# Patient Record
Sex: MALE | Race: WHITE | NOT HISPANIC OR LATINO | URBAN - METROPOLITAN AREA
[De-identification: names, ages, dates, MRNs, and addresses within clinical notes are randomized per-mention and may not be internally consistent; named-entity substitution may affect disease eponyms.]

---

## 2017-04-27 ENCOUNTER — EMERGENCY (EMERGENCY)
Facility: HOSPITAL | Age: 37
LOS: 1 days | Discharge: HOME | End: 2017-04-27
Attending: EMERGENCY MEDICINE | Admitting: EMERGENCY MEDICINE
Payer: COMMERCIAL

## 2017-04-27 VITALS — WEIGHT: 179.9 LBS | HEIGHT: 75 IN

## 2017-04-27 DIAGNOSIS — M25.531 PAIN IN RIGHT WRIST: ICD-10-CM
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
## 2017-04-27 DIAGNOSIS — Z79.899 OTHER LONG TERM (CURRENT) DRUG THERAPY: ICD-10-CM

## 2017-04-27 PROCEDURE — 99283 EMERGENCY DEPT VISIT LOW MDM: CPT

## 2017-04-27 PROCEDURE — 73110 X-RAY EXAM OF WRIST: CPT | Mod: 26,RT

## 2017-04-27 PROCEDURE — 99283 EMERGENCY DEPT VISIT LOW MDM: CPT | Mod: 25

## 2017-04-27 PROCEDURE — 73110 X-RAY EXAM OF WRIST: CPT

## 2017-04-27 RX ORDER — BUPRENORPHINE AND NALOXONE 2; .5 MG/1; MG/1
1 TABLET SUBLINGUAL
Qty: 0 | Refills: 0 | COMMUNITY

## 2017-04-27 RX ORDER — CLONAZEPAM 1 MG
0 TABLET ORAL
Qty: 0 | Refills: 0 | COMMUNITY

## 2017-04-27 RX ORDER — ACETAMINOPHEN 500 MG
975 TABLET ORAL ONCE
Qty: 0 | Refills: 0 | Status: COMPLETED | OUTPATIENT
Start: 2017-04-27 | End: 2017-04-27

## 2017-04-27 RX ADMIN — Medication 975 MILLIGRAM(S): at 10:27

## 2017-04-27 RX ADMIN — Medication 975 MILLIGRAM(S): at 09:57

## 2017-04-27 NOTE — ED ADULT NURSE NOTE - OBJECTIVE STATEMENT
36 yr old male with h/o drug abuse present to the ER for s/p fall. Pt reports he was walking his dog this morning when he slipped and fell on a wet pavement sustaining injury to the right wrist. Pt reports pain level of 8/10 on pain scale and aching in quality. No swelling or deformity noted to the affected hand. Pt has full ROM in all extremities.

## 2017-04-27 NOTE — ED PROVIDER NOTE - CARE PLAN
Principal Discharge DX:	Wrist pain, acute, right  Instructions for follow-up, activity and diet:	1. You may take Motrin 600mg every 6 hours as needed for pain. You may use the provided wrist splint for support and comfort.  2. Follow up with your Primary Care Physician as soon as possible for further evaluation.   3. Return to the Emergency Department for any concerning symptoms.

## 2017-04-27 NOTE — ED PROVIDER NOTE - MEDICAL DECISION MAKING DETAILS
Attending Merchant: 35 y/o male s/p fall onto outstretched hand with complaints of wrist pain. no ttp anatomic snuffbox, or deformity seen. pt able to range hand. xray shows no signs of fracture. will advised RICE, follow up if pain continues

## 2017-04-27 NOTE — ED ADULT NURSE NOTE - CHPI ED SYMPTOMS NEG
no tingling/no numbness/no fever/no weakness/no deformity/no loss of consciousness/no vomiting/no abrasion/no confusion/no bleeding

## 2017-04-27 NOTE — ED PROVIDER NOTE - OBJECTIVE STATEMENT
36 y.o. male no PMHx p/w right wrist injury. Patient was walking his dog this morning when he slipped on the wet pavement, attempted to brace himself with the right hand. He reports pain in the area of the distal radius. Denies numbness/tingling. Did not take pain medications (is in recovery for drug dependence).

## 2017-11-22 ENCOUNTER — EMERGENCY (EMERGENCY)
Facility: HOSPITAL | Age: 37
LOS: 1 days | Discharge: HOME | End: 2017-11-22
Attending: EMERGENCY MEDICINE | Admitting: EMERGENCY MEDICINE
Payer: COMMERCIAL

## 2017-11-22 VITALS
SYSTOLIC BLOOD PRESSURE: 122 MMHG | TEMPERATURE: 100 F | HEART RATE: 107 BPM | WEIGHT: 175.05 LBS | HEIGHT: 75 IN | DIASTOLIC BLOOD PRESSURE: 83 MMHG | RESPIRATION RATE: 19 BRPM | OXYGEN SATURATION: 100 %
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
## 2017-11-22 DIAGNOSIS — Z79.899 OTHER LONG TERM (CURRENT) DRUG THERAPY: ICD-10-CM
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
## 2017-11-22 DIAGNOSIS — M25.531 PAIN IN RIGHT WRIST: ICD-10-CM

## 2017-11-22 LAB
ALBUMIN SERPL ELPH-MCNC: 4.5 G/DL — SIGNIFICANT CHANGE UP (ref 3.3–5)
ALP SERPL-CCNC: 54 U/L — SIGNIFICANT CHANGE UP (ref 40–120)
ALT FLD-CCNC: 10 U/L RC — SIGNIFICANT CHANGE UP (ref 10–45)
ANION GAP SERPL CALC-SCNC: 14 MMOL/L — SIGNIFICANT CHANGE UP (ref 5–17)
AST SERPL-CCNC: 12 U/L — SIGNIFICANT CHANGE UP (ref 10–40)
BASOPHILS # BLD AUTO: 0.1 K/UL — SIGNIFICANT CHANGE UP (ref 0–0.2)
BASOPHILS NFR BLD AUTO: 0.6 % — SIGNIFICANT CHANGE UP (ref 0–2)
BILIRUB SERPL-MCNC: 1 MG/DL — SIGNIFICANT CHANGE UP (ref 0.2–1.2)
BUN SERPL-MCNC: 9 MG/DL — SIGNIFICANT CHANGE UP (ref 7–23)
CALCIUM SERPL-MCNC: 9.9 MG/DL — SIGNIFICANT CHANGE UP (ref 8.4–10.5)
CHLORIDE SERPL-SCNC: 97 MMOL/L — SIGNIFICANT CHANGE UP (ref 96–108)
CO2 SERPL-SCNC: 28 MMOL/L — SIGNIFICANT CHANGE UP (ref 22–31)
CREAT SERPL-MCNC: 0.89 MG/DL — SIGNIFICANT CHANGE UP (ref 0.5–1.3)
EOSINOPHIL # BLD AUTO: 0 K/UL — SIGNIFICANT CHANGE UP (ref 0–0.5)
EOSINOPHIL NFR BLD AUTO: 0.3 % — SIGNIFICANT CHANGE UP (ref 0–6)
GLUCOSE SERPL-MCNC: 110 MG/DL — HIGH (ref 70–99)
HCT VFR BLD CALC: 45.2 % — SIGNIFICANT CHANGE UP (ref 39–50)
HGB BLD-MCNC: 15.5 G/DL — SIGNIFICANT CHANGE UP (ref 13–17)
LYMPHOCYTES # BLD AUTO: 1.1 K/UL — SIGNIFICANT CHANGE UP (ref 1–3.3)
LYMPHOCYTES # BLD AUTO: 8.8 % — LOW (ref 13–44)
MCHC RBC-ENTMCNC: 33.3 PG — SIGNIFICANT CHANGE UP (ref 27–34)
MCHC RBC-ENTMCNC: 34.3 GM/DL — SIGNIFICANT CHANGE UP (ref 32–36)
MCV RBC AUTO: 97.2 FL — SIGNIFICANT CHANGE UP (ref 80–100)
MONOCYTES # BLD AUTO: 1.4 K/UL — HIGH (ref 0–0.9)
MONOCYTES NFR BLD AUTO: 11 % — SIGNIFICANT CHANGE UP (ref 2–14)
NEUTROPHILS # BLD AUTO: 10.3 K/UL — HIGH (ref 1.8–7.4)
NEUTROPHILS NFR BLD AUTO: 79.3 % — HIGH (ref 43–77)
PLATELET # BLD AUTO: 272 K/UL — SIGNIFICANT CHANGE UP (ref 150–400)
POTASSIUM SERPL-MCNC: 4 MMOL/L — SIGNIFICANT CHANGE UP (ref 3.5–5.3)
POTASSIUM SERPL-SCNC: 4 MMOL/L — SIGNIFICANT CHANGE UP (ref 3.5–5.3)
PROT SERPL-MCNC: 7.4 G/DL — SIGNIFICANT CHANGE UP (ref 6–8.3)
RBC # BLD: 4.65 M/UL — SIGNIFICANT CHANGE UP (ref 4.2–5.8)
RBC # FLD: 11.7 % — SIGNIFICANT CHANGE UP (ref 10.3–14.5)
SODIUM SERPL-SCNC: 139 MMOL/L — SIGNIFICANT CHANGE UP (ref 135–145)
WBC # BLD: 13 K/UL — HIGH (ref 3.8–10.5)
WBC # FLD AUTO: 13 K/UL — HIGH (ref 3.8–10.5)

## 2017-11-22 PROCEDURE — 96375 TX/PRO/DX INJ NEW DRUG ADDON: CPT | Mod: XU

## 2017-11-22 PROCEDURE — 70487 CT MAXILLOFACIAL W/DYE: CPT

## 2017-11-22 PROCEDURE — 85027 COMPLETE CBC AUTOMATED: CPT

## 2017-11-22 PROCEDURE — 99285 EMERGENCY DEPT VISIT HI MDM: CPT

## 2017-11-22 PROCEDURE — 96374 THER/PROPH/DIAG INJ IV PUSH: CPT | Mod: XU

## 2017-11-22 PROCEDURE — 80053 COMPREHEN METABOLIC PANEL: CPT

## 2017-11-22 PROCEDURE — 70487 CT MAXILLOFACIAL W/DYE: CPT | Mod: 26

## 2017-11-22 PROCEDURE — 99284 EMERGENCY DEPT VISIT MOD MDM: CPT | Mod: 25

## 2017-11-22 RX ORDER — AMPICILLIN SODIUM AND SULBACTAM SODIUM 250; 125 MG/ML; MG/ML
1.5 INJECTION, POWDER, FOR SUSPENSION INTRAMUSCULAR; INTRAVENOUS ONCE
Qty: 0 | Refills: 0 | Status: COMPLETED | OUTPATIENT
Start: 2017-11-22 | End: 2017-11-22

## 2017-11-22 RX ORDER — KETOROLAC TROMETHAMINE 30 MG/ML
15 SYRINGE (ML) INJECTION ONCE
Qty: 0 | Refills: 0 | Status: DISCONTINUED | OUTPATIENT
Start: 2017-11-22 | End: 2017-11-22

## 2017-11-22 RX ORDER — SODIUM CHLORIDE 9 MG/ML
2000 INJECTION INTRAMUSCULAR; INTRAVENOUS; SUBCUTANEOUS ONCE
Qty: 0 | Refills: 0 | Status: COMPLETED | OUTPATIENT
Start: 2017-11-22 | End: 2017-11-22

## 2017-11-22 RX ADMIN — Medication 15 MILLIGRAM(S): at 13:37

## 2017-11-22 RX ADMIN — SODIUM CHLORIDE 1000 MILLILITER(S): 9 INJECTION INTRAMUSCULAR; INTRAVENOUS; SUBCUTANEOUS at 13:37

## 2017-11-22 RX ADMIN — AMPICILLIN SODIUM AND SULBACTAM SODIUM 100 GRAM(S): 250; 125 INJECTION, POWDER, FOR SUSPENSION INTRAMUSCULAR; INTRAVENOUS at 13:36

## 2017-11-22 NOTE — ED PROVIDER NOTE - PROGRESS NOTE DETAILS
Patient has holden-apical abscesses, spoke with dental resident, will see in clinic. Zabrina Peguero DO

## 2017-11-22 NOTE — ED PROVIDER NOTE - ATTENDING CONTRIBUTION TO CARE
Trinidad Nguyen MD  38yo male with left dental pain and left facial swelling, concerning for dental abscess with extension into sinuses, less concerning for cavernous venous thrombosis with normal neuro exam, on exam YUE, EOMI, dental caries 12-14; facial swelling and under the left eye; Plan: IV antibiotics, obtain CT maxillofacil, labs, dental consulted, continuing to follow.

## 2017-11-22 NOTE — ED PROVIDER NOTE - MEDICAL DECISION MAKING DETAILS
36yo male with left dental pain and left facial swelling, concerning for dental abscess with extension into sinuses, less concerning for cavernous venous thrombosis with normal neuro exam, will give IV antibiotics, obtain CT maxillofacil, labs, dental consulted, continuing to follow. Zabrina Peguero DO

## 2017-11-22 NOTE — ED PROVIDER NOTE - CARE PLAN
Principal Discharge DX:	Periapical abscess  Instructions for follow-up, activity and diet:	1. Please go to the dental clinic at [address] [place] [address] after you leave the emergency department today.   2.  Return to the Emergency Department for worsening, progressive or any other concerning symptoms.

## 2017-11-22 NOTE — ED PROVIDER NOTE - PHYSICAL EXAMINATION
Gen: NAD, AOx3  Head: NCAT  HEENT: EOMI, poor dentition, with fluctuance over buccal surface of left upper molar, +TTP over maxillary sinus, +facial swelling over left side of face  MSK: No edema, no visible deformities, full range of motion in all 4 extremities  Neuro: CN II-XII grossly intact, No focal neurologic deficits  Skin: No rash   Psych: normal affect

## 2017-11-22 NOTE — ED PROVIDER NOTE - OBJECTIVE STATEMENT
36yo male no PMH presenting with left upper molar pain x 2 days, started on amoxicillin yesterday and told that he needs to have a root canal which is scheduled for Monday. Patient woke up this morning and noticed that the left side of his face was swollen, with fever tmax 101F. Took Tylenol earlier this morning with some relief. No visual changes. No drainage from the tooth.

## 2017-11-22 NOTE — ED PROVIDER NOTE - NOTES
Spoke with dental resident, plan for CT maxillofacial to r/o extension into sinuses, will call dental resident back when resulted and discuss plan to see patient in ED vs office later today. Zabrina Peguero DO

## 2017-11-22 NOTE — ED ADULT NURSE NOTE - OBJECTIVE STATEMENT
Male 37 years old with no medical history came in for left face pain and swelling. PT had tooth ache yesterday and went to dentist and was started on Amoxicillin. Last night he had a fever temp 101 and this morning he noticed his left face red and swollen. Denies nausea, vomiting or change in vision. PERRL. Labs obtained. Will monitor.

## 2017-11-22 NOTE — ED PROVIDER NOTE - PLAN OF CARE
1. Please go to the dental clinic at [address] [place] [phone] after you leave the emergency department today.   2.  Return to the Emergency Department for worsening, progressive or any other concerning symptoms.

## 2021-05-10 ENCOUNTER — APPOINTMENT (OUTPATIENT)
Dept: DISASTER EMERGENCY | Facility: OTHER | Age: 41
End: 2021-05-10
Payer: COMMERCIAL

## 2021-05-10 PROCEDURE — 0012A: CPT

## 2022-12-12 ENCOUNTER — APPOINTMENT (OUTPATIENT)
Dept: NEUROLOGY | Facility: CLINIC | Age: 42
End: 2022-12-12

## 2022-12-12 VITALS
WEIGHT: 155 LBS | BODY MASS INDEX: 19.89 KG/M2 | DIASTOLIC BLOOD PRESSURE: 77 MMHG | HEIGHT: 74 IN | HEART RATE: 54 BPM | SYSTOLIC BLOOD PRESSURE: 143 MMHG

## 2022-12-12 DIAGNOSIS — S09.90XA UNSPECIFIED INJURY OF HEAD, INITIAL ENCOUNTER: ICD-10-CM

## 2022-12-12 PROCEDURE — 99205 OFFICE O/P NEW HI 60 MIN: CPT

## 2022-12-12 NOTE — PHYSICAL EXAM
[General Appearance - Alert] : alert [Oriented To Time, Place, And Person] : oriented to person, place, and time [Person] : oriented to person [Place] : oriented to place [Time] : oriented to time [Short Term Intact] : short term memory intact [Fluency] : fluency intact [Current Events] : adequate knowledge of current events [Cranial Nerves Optic (II)] : visual acuity intact bilaterally,  visual fields full to confrontation, pupils equal round and reactive to light [Cranial Nerves Oculomotor (III)] : extraocular motion intact [Cranial Nerves Trigeminal (V)] : facial sensation intact symmetrically [Cranial Nerves Facial (VII)] : face symmetrical [Cranial Nerves Vestibulocochlear (VIII)] : hearing was intact bilaterally [Cranial Nerves Accessory (XI - Cranial And Spinal)] : head turning and shoulder shrug symmetric [Motor Tone] : muscle tone was normal in all four extremities [Motor Strength] : muscle strength was normal in all four extremities [Sensation Tactile Decrease] : light touch was intact [Abnormal Walk] : normal gait [Coordination - Dysmetria Impaired Finger-to-Nose Bilateral] : not present [1+] : Patella left 1+ [FreeTextEntry8] : bracing his arms out in case he felt lightheaded.

## 2022-12-12 NOTE — HISTORY OF PRESENT ILLNESS
[FreeTextEntry1] : 42-year-old gentleman who hit the back of his head when he got up from a kneeling position and he hit the back of his head on a wooden frame.  This occurred yesterday on December 11.  Patient did not have any loss of consciousness however he continues to have headaches along with lightheadedness when he moves his head quickly and photophobia.  Patient has no prior history of headaches and no history of concussions before this.

## 2022-12-19 ENCOUNTER — APPOINTMENT (OUTPATIENT)
Dept: CT IMAGING | Facility: CLINIC | Age: 42
End: 2022-12-19

## 2022-12-19 ENCOUNTER — TRANSCRIPTION ENCOUNTER (OUTPATIENT)
Age: 42
End: 2022-12-19

## 2022-12-19 PROCEDURE — 70450 CT HEAD/BRAIN W/O DYE: CPT

## 2022-12-20 ENCOUNTER — NON-APPOINTMENT (OUTPATIENT)
Age: 42
End: 2022-12-20

## 2022-12-28 ENCOUNTER — NON-APPOINTMENT (OUTPATIENT)
Age: 42
End: 2022-12-28

## 2023-01-10 ENCOUNTER — APPOINTMENT (OUTPATIENT)
Dept: MRI IMAGING | Facility: CLINIC | Age: 43
End: 2023-01-10

## 2024-09-17 ENCOUNTER — APPOINTMENT (OUTPATIENT)
Dept: ORTHOPEDIC SURGERY | Facility: CLINIC | Age: 44
End: 2024-09-17

## 2024-09-17 VITALS — BODY MASS INDEX: 19.89 KG/M2 | HEIGHT: 74 IN | WEIGHT: 155 LBS

## 2024-09-17 DIAGNOSIS — M75.02 ADHESIVE CAPSULITIS OF LEFT SHOULDER: ICD-10-CM

## 2024-09-17 DIAGNOSIS — Z78.9 OTHER SPECIFIED HEALTH STATUS: ICD-10-CM

## 2024-09-17 DIAGNOSIS — M75.32 CALCIFIC TENDINITIS OF LEFT SHOULDER: ICD-10-CM

## 2024-09-17 DIAGNOSIS — M75.112 INCOMPLETE ROTATOR CUFF TEAR OR RUPTURE OF LEFT SHOULDER, NOT SPECIFIED AS TRAUMATIC: ICD-10-CM

## 2024-09-17 PROCEDURE — 99204 OFFICE O/P NEW MOD 45 MIN: CPT | Mod: 25

## 2024-09-17 PROCEDURE — J3490M: CUSTOM

## 2024-09-17 PROCEDURE — 20611 DRAIN/INJ JOINT/BURSA W/US: CPT | Mod: LT

## 2024-09-17 PROCEDURE — 73030 X-RAY EXAM OF SHOULDER: CPT | Mod: LT

## 2024-09-17 RX ORDER — MELOXICAM 15 MG/1
15 TABLET ORAL
Qty: 30 | Refills: 2 | Status: ACTIVE | COMMUNITY
Start: 2024-09-17 | End: 1900-01-01

## 2024-09-17 NOTE — PHYSICAL EXAM
[] : motor and sensory intact distally [Left] : left shoulder [There are no fractures, subluxations or dislocations. No significant abnormalities are seen] : There are no fractures, subluxations or dislocations. No significant abnormalities are seen

## 2024-09-17 NOTE — DISCUSSION/SUMMARY
[de-identified] : modify activities try OTC meds ice as needed try topical lidocaine for pain control reviewed current medications used by this patient home exercises for functional return 09/17/2024    RE:  FRANCIS RINCON   [id number]    Attention:  Nurse Reviewer /Medical Director  I am writing this letter as a medical necessity for PT program. Patient has tried analgesics, non-steroid anti-inflammatory agents,  hot or cold compresses,injections of corticosteroids, etc)  which in combination or by themselves has not worked. Based on my patient's condition, I strongly believe that the PT is medically needed.   Thank you for your time and consideration.

## 2024-09-17 NOTE — HISTORY OF PRESENT ILLNESS
[Rest] : rest [Gradual] : gradual [7] : 7 [0] : 0 [Dull/Aching] : dull/aching [Sharp] : sharp [Intermittent] : intermittent [Meds] : meds [de-identified] : he is RHD, pain for a few months, no injury, he is ambi, uses OTCs with some help, not activity related, pain is intermittent [] : no [FreeTextEntry1] : LT SHOULDER  [FreeTextEntry5] : Patient states he has been treated for left shoulder pain in past and lately the pain got much worse. Patient went to PCP and was sent for MRI.  [FreeTextEntry9] : motrin  [de-identified] : certain positions  [de-identified] : PCP  [de-identified] : MRI

## 2024-09-17 NOTE — PROCEDURE
[FreeTextEntry3] : Large Joint Injection / Aspiration: Celestone, Lidocaine, Marcaine and Guidance Ultrasound Large Joint Injection was performed because of pain and inflammation. Anesthesia: ethyl chloride sprayed topically..  Celestone: An injection of Celestone 12 mg , 2 cc. Needle size: 22 gauge , 1.5 inch.  Lidocaine: 3 cc.  Marcaine: 3 cc.   Medication was injected in the left shoulder. Patient has tried OTC's including aspirin, Ibuprofen, Aleve etc or prescription NSAIDS, and/or exercises at home and/ or physical therapy without satisfactory response. After verbal consent using sterile preparation and technique. The risks, benefits, and alternatives to cortisone injection were explained in full to the patient. Risks outlined include but are not limited to infection, sepsis, bleeding, scarring, skin discoloration, temporary increase in pain, syncopal episode, failure to resolve symptoms, allergic reaction, symptom recurrence, and elevation of blood sugar in diabetics. Patient understood the risks. All questions were answered. After discussion of options, patient requested an injection. Oral informed consent was obtained and sterile prep was done of the injection site. Sterile technique was utilized for the procedure including the preparation of the solutions used for the injection. Patient tolerated the procedure well. Advised to ice the injection site this evening. Prep with betadine locally to site. Sterile technique used. Patient tolerated procedure well. Post Procedure Instructions: Patient was advised to call if redness, pain, or fever occur and apply ice for 15 min. out of every hour for the next 12-24 hours as tolerated. patient was advised to rest the joint(s) for 1 days.   Ultrasound Guidance was used for the following reasons: for Glenohumeral injection.   Ultrasound guided injection was performed of the shoulder, visualization of the needle and placement of injection was performed without complication.

## 2024-09-17 NOTE — HISTORY OF PRESENT ILLNESS
[Rest] : rest [Gradual] : gradual [7] : 7 [0] : 0 [Dull/Aching] : dull/aching [Sharp] : sharp [Intermittent] : intermittent [Meds] : meds [de-identified] : he is RHD, pain for a few months, no injury, he is ambi, uses OTCs with some help, not activity related, pain is intermittent [] : no [FreeTextEntry1] : LT SHOULDER  [FreeTextEntry5] : Patient states he has been treated for left shoulder pain in past and lately the pain got much worse. Patient went to PCP and was sent for MRI.  [FreeTextEntry9] : motrin  [de-identified] : certain positions  [de-identified] : PCP  [de-identified] : MRI

## 2024-09-17 NOTE — DISCUSSION/SUMMARY
[de-identified] : modify activities try OTC meds ice as needed try topical lidocaine for pain control reviewed current medications used by this patient home exercises for functional return 09/17/2024    RE[facility]  FR[facility]IS RINCON   [id number]    Attention:  Nurse Reviewer /Medical Director  I am writing this letter as a medical necessity for PT program. Patient has tried analgesics, non-steroid anti-inflammatory agents,  hot or cold compresses,injections of corticosteroids, etc)  which in combination or by themselves has not worked. Based on my patient's condition, I strongly believe that the PT is medically needed.   Thank you for your time and consideration.

## 2024-09-17 NOTE — DATA REVIEWED
[MRI] : MRI [Left] : left [Shoulder] : shoulder [Report was reviewed and noted in the chart] : The report was reviewed and noted in the chart [I reviewed the films/CD and agree] : I reviewed the films/CD and agree [FreeTextEntry1] : strain RTC, calcific dep, IS

## 2024-10-29 ENCOUNTER — APPOINTMENT (OUTPATIENT)
Dept: ORTHOPEDIC SURGERY | Facility: CLINIC | Age: 44
End: 2024-10-29

## 2025-01-11 ENCOUNTER — EMERGENCY (EMERGENCY)
Facility: HOSPITAL | Age: 45
LOS: 0 days | Discharge: HOME | End: 2025-01-11
Attending: STUDENT IN AN ORGANIZED HEALTH CARE EDUCATION/TRAINING PROGRAM
Payer: COMMERCIAL

## 2025-01-11 VITALS
TEMPERATURE: 99 F | HEART RATE: 62 BPM | DIASTOLIC BLOOD PRESSURE: 75 MMHG | RESPIRATION RATE: 17 BRPM | SYSTOLIC BLOOD PRESSURE: 132 MMHG | OXYGEN SATURATION: 100 %

## 2025-01-11 VITALS
HEIGHT: 75 IN | HEART RATE: 81 BPM | SYSTOLIC BLOOD PRESSURE: 187 MMHG | DIASTOLIC BLOOD PRESSURE: 85 MMHG | OXYGEN SATURATION: 100 % | RESPIRATION RATE: 15 BRPM | TEMPERATURE: 98 F | WEIGHT: 160.06 LBS

## 2025-01-11 DIAGNOSIS — R25.1 TREMOR, UNSPECIFIED: ICD-10-CM

## 2025-01-11 DIAGNOSIS — R52 PAIN, UNSPECIFIED: ICD-10-CM

## 2025-01-11 DIAGNOSIS — R11.0 NAUSEA: ICD-10-CM

## 2025-01-11 PROCEDURE — 99284 EMERGENCY DEPT VISIT MOD MDM: CPT

## 2025-01-11 RX ORDER — ONDANSETRON HCL/PF 4 MG/2 ML
4 VIAL (ML) INJECTION ONCE
Refills: 0 | Status: COMPLETED | OUTPATIENT
Start: 2025-01-11 | End: 2025-01-11

## 2025-01-11 RX ORDER — KETOROLAC TROMETHAMINE 30 MG/ML
15 INJECTION, SOLUTION INTRAMUSCULAR; INTRAVENOUS ONCE
Refills: 0 | Status: DISCONTINUED | OUTPATIENT
Start: 2025-01-11 | End: 2025-01-11

## 2025-01-11 RX ORDER — BUPRENORPHINE HYDROCHLORIDE, NALOXONE HYDROCHLORIDE 4; 1 MG/1; MG/1
1 FILM, SOLUBLE BUCCAL; SUBLINGUAL ONCE
Refills: 0 | Status: DISCONTINUED | OUTPATIENT
Start: 2025-01-11 | End: 2025-01-11

## 2025-01-11 RX ADMIN — Medication 1000 MILLILITER(S): at 14:45

## 2025-01-11 RX ADMIN — BUPRENORPHINE HYDROCHLORIDE, NALOXONE HYDROCHLORIDE 1 FILM(S): 4; 1 FILM, SOLUBLE BUCCAL; SUBLINGUAL at 14:58

## 2025-01-11 RX ADMIN — KETOROLAC TROMETHAMINE 15 MILLIGRAM(S): 30 INJECTION, SOLUTION INTRAMUSCULAR; INTRAVENOUS at 14:45

## 2025-01-11 RX ADMIN — Medication 4 MILLIGRAM(S): at 13:09

## 2025-01-11 RX ADMIN — Medication 1000 MILLILITER(S): at 13:09

## 2025-01-11 RX ADMIN — Medication 0.1 MILLIGRAM(S): at 13:09

## 2025-01-11 RX ADMIN — BUPRENORPHINE HYDROCHLORIDE, NALOXONE HYDROCHLORIDE 1 FILM(S): 4; 1 FILM, SOLUBLE BUCCAL; SUBLINGUAL at 13:09

## 2025-01-11 RX ADMIN — KETOROLAC TROMETHAMINE 15 MILLIGRAM(S): 30 INJECTION, SOLUTION INTRAMUSCULAR; INTRAVENOUS at 13:09

## 2025-01-13 ENCOUNTER — EMERGENCY (EMERGENCY)
Facility: HOSPITAL | Age: 45
LOS: 0 days | Discharge: HOME | End: 2025-01-13
Attending: STUDENT IN AN ORGANIZED HEALTH CARE EDUCATION/TRAINING PROGRAM
Payer: COMMERCIAL

## 2025-01-13 VITALS
TEMPERATURE: 98 F | OXYGEN SATURATION: 100 % | RESPIRATION RATE: 17 BRPM | SYSTOLIC BLOOD PRESSURE: 144 MMHG | HEART RATE: 56 BPM | DIASTOLIC BLOOD PRESSURE: 77 MMHG

## 2025-01-13 VITALS
HEART RATE: 84 BPM | HEIGHT: 75 IN | SYSTOLIC BLOOD PRESSURE: 173 MMHG | DIASTOLIC BLOOD PRESSURE: 92 MMHG | OXYGEN SATURATION: 100 % | WEIGHT: 160.06 LBS | TEMPERATURE: 98 F | RESPIRATION RATE: 18 BRPM

## 2025-01-13 DIAGNOSIS — F11.20 OPIOID DEPENDENCE, UNCOMPLICATED: ICD-10-CM

## 2025-01-13 DIAGNOSIS — Z91.138 PATIENT'S UNINTENTIONAL UNDERDOSING OF MEDICATION REGIMEN FOR OTHER REASON: ICD-10-CM

## 2025-01-13 DIAGNOSIS — T40.496A UNDERDOSING OF OTHER SYNTHETIC NARCOTICS, INITIAL ENCOUNTER: ICD-10-CM

## 2025-01-13 PROCEDURE — 99283 EMERGENCY DEPT VISIT LOW MDM: CPT

## 2025-01-13 RX ORDER — BUPRENORPHINE HYDROCHLORIDE AND NALOXONE HYDROCHLORIDE DIHYDRATE 8; 2 MG/1; MG/1
2 TABLET SUBLINGUAL ONCE
Refills: 0 | Status: DISCONTINUED | OUTPATIENT
Start: 2025-01-13 | End: 2025-01-13

## 2025-01-13 RX ADMIN — BUPRENORPHINE HYDROCHLORIDE AND NALOXONE HYDROCHLORIDE DIHYDRATE 2 FILM(S): 8; 2 TABLET SUBLINGUAL at 08:30

## 2025-01-13 NOTE — ED PROVIDER NOTE - PATIENT PORTAL LINK FT
You can access the FollowMyHealth Patient Portal offered by [facility] by registering at the following website: [email or web address]. By joining [employer]’s FollowMyHealth portal, you will also be able to view your health information using other applications (apps) compatible with our system.
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
## 2025-01-13 NOTE — ED ADULT TRIAGE NOTE - CHIEF COMPLAINT QUOTE
pt came in for Suboxone withdrawal.  Was seen here Saturday for same reason.  Prior to that, last dose of Suboxone was last thurs.  Has f/u appt w/ doctor later on today for prescription  refill.  c/o generalized body aches, tremors and nausea

## 2025-01-13 NOTE — ED PROVIDER NOTE - CLINICAL SUMMARY MEDICAL DECISION MAKING FREE TEXT BOX
44-year-old male pmhx of opioid abuse on Suboxone comes to ED w/ opiate withdrawal concerns secondary to missed Suboxone dose. Patient misplaced their prescription at a hotel 3-4 days prior.  Reports that they will be able to get a new prescription today that can be refilled tomorrow from their primary doctor however due to withdrawals concerns today decided to come to emergency department for a dose. Their pain/symptom is mild, constant, non mediating with rest. Otherwise ROS negative.    General: non-toxic, NAD   HEENT: NCAT, PERRL   Cardiac: RRR, no murmurs, 2+ peripheral pulses   Chest: CTAB  Abdomen: soft, non-distended, bowel sounds present, no ttp, no rebound or guarding   Extremities: no peripheral edema, calf tenderness, or leg size discrepancies   Skin: no rashes   Neuro: AAOx4, 5+motor, sensory grossly intact   Psych: mood and affect appropriate    Impression: 44-year-old male pmhx of opioid abuse on Suboxone comes to ED w/ opiate withdrawal concerns secondary to missed Suboxone dose. Plan to administer home dose of Suboxone and sent for follow-up outpatient.

## 2025-01-13 NOTE — ED PROVIDER NOTE - NSFOLLOWUPINSTRUCTIONS_ED_ALL_ED_FT
You were seen in the Emergency Department for opiate withdrawal concerns secondary to missed Suboxone dose.    1) Advance activity as tolerated.   2) Continue all previously prescribed medications as directed.    3) Follow up with  your primary care physician in as soon as possible - take copies of your results.    4) Return to the Emergency Department for worsening or persistent symptoms, and/or ANY NEW OR CONCERNING SYMPTOMS.    SEEK IMMEDIATE MEDICAL CARE IF YOU HAVE ANY OF THE FOLLOWING SYMPTOMS: chest pain, shortness of breath, change in mental status, thoughts about hurting killing yourself, thoughts about hurting or killing somebody else, hallucinations, or worsening depression.

## 2025-02-10 ENCOUNTER — EMERGENCY (EMERGENCY)
Facility: HOSPITAL | Age: 45
LOS: 1 days | Discharge: HOME | End: 2025-02-10
Attending: EMERGENCY MEDICINE
Payer: COMMERCIAL

## 2025-02-10 VITALS
TEMPERATURE: 98 F | OXYGEN SATURATION: 98 % | HEART RATE: 85 BPM | WEIGHT: 160.06 LBS | DIASTOLIC BLOOD PRESSURE: 87 MMHG | SYSTOLIC BLOOD PRESSURE: 136 MMHG | RESPIRATION RATE: 19 BRPM | HEIGHT: 75 IN

## 2025-02-10 PROCEDURE — 99284 EMERGENCY DEPT VISIT MOD MDM: CPT

## 2025-02-10 PROCEDURE — 99283 EMERGENCY DEPT VISIT LOW MDM: CPT

## 2025-02-10 RX ORDER — BUPRENORPHINE AND NALOXONE 8; 2 MG/1; MG/1
2 FILM BUCCAL; SUBLINGUAL ONCE
Refills: 0 | Status: DISCONTINUED | OUTPATIENT
Start: 2025-02-10 | End: 2025-02-10

## 2025-02-10 RX ORDER — BUPRENORPHINE AND NALOXONE 8; 2 MG/1; MG/1
8 FILM BUCCAL; SUBLINGUAL ONCE
Refills: 0 | Status: DISCONTINUED | OUTPATIENT
Start: 2025-02-10 | End: 2025-02-10

## 2025-02-10 RX ADMIN — BUPRENORPHINE AND NALOXONE 2 TABLET(S): 8; 2 FILM BUCCAL; SUBLINGUAL at 12:44

## 2025-02-10 NOTE — ED ADULT NURSE NOTE - DRUG PRE-SCREENING (DAST -1)
1630p RADIAL Compression removal:     Initial  Cuff  volume   12   cc

        1630p    -2cc Removed  No hematoma/bleeding noted with normal  neurovascular 
function.



         1645p   -5cc Removed  No hematoma/ bleeding noted with normal  neurovascular 
function.



         1700p    -5cc Removed  No hematoma/bleeding noted with normal  neurovascular 
function.



              

Air removal completed. 

Stasis achieved sterile 2x2,Tegaderm,  Coban  dressing  No hematoma, bleeding noted with 
normal neurovascular function. Wrist splint in place. Pt instructed on POC.

Ds/Rn Statement Selected

## 2025-02-10 NOTE — ED PROVIDER NOTE - NS ED MD DISPO DISCHARGE
[Dear  ___] : Dear  [unfilled], [Courtesy Letter:] : I had the pleasure of seeing your patient, [unfilled], in my office today. [Please see my note below.] : Please see my note below. [Consult Closing:] : Thank you very much for allowing me to participate in the care of this patient.  If you have any questions, please do not hesitate to contact me. [FreeTextEntry3] : Respectfully,\par \par Cliff Samaniego M.D., FACS\par  Home

## 2025-02-10 NOTE — ED ADULT TRIAGE NOTE - CHIEF COMPLAINT QUOTE
opioid withdrawal. (chills, anxiety, nausea)  Left suboxone in [place].    took last dose on Saturday.

## 2025-02-10 NOTE — ED PROVIDER NOTE - CLINICAL SUMMARY MEDICAL DECISION MAKING FREE TEXT BOX
Well-appearing patient presenting with mild symptoms of opioid withdrawal.  Will give him 1 dose of Suboxone and patient to follow-up with his PMD tomorrow.

## 2025-02-10 NOTE — ED PROVIDER NOTE - NSFOLLOWUPINSTRUCTIONS_ED_ALL_ED_FT
Please follow-up with your primary care doctor tomorrow.  Please be vigilant not to leave your Suboxone prescriptions behind  Return to the emergency department for worsening symptoms, thoughts of hurting yourself, or any other concerns.    Opioid Withdrawal  Opioids are powerful substances that relieve pain. Opioids include illegal drugs, such as heroin, as well as prescription pain medicines, such as codeine, morphine, hydrocodone, and fentanyl. Opioid withdrawal can happen if you have been taking opioids for a period of time and then suddenly stop.    Opioid withdrawal can be mild to severe, and it may include a variety of different symptoms. It is not usually life-threatening.    What are the causes?  This condition is caused by taking opioids for a long period of time, usually over several weeks (or even only 5 days), and then doing any of the following:  Stopping use completely.  Rapidly reducing their use, either by reducing the dose or the frequency of use.  Taking a medicine to block the effect of the opioid (opioid antagonist).  What increases the risk?  The following factors may make you more likely to develop this condition:  Taking opioids for a long period of time.  Taking opioids incorrectly.  Having a history of opioid, alcohol, or illicit drug use and withdrawal from those substances.  What are the signs or symptoms?  Symptoms of this condition can be physical or mental. Mild physical symptoms include:  Nausea.  Muscle aches or spasms.  Watery eyes and runny nose.  Widening of the dark centers of the eyes (dilated pupils).  Flushing and itching of the skin.  Moderate symptoms of this condition include:  Stomach cramps and diarrhea.  Vomiting.  Increased blood pressure and fast pulse.  Chills or sweating.  Twitching or shaking (tremors).  Mental symptoms include:  Depression.  Anxiety.  Restlessness and irritability.  Trouble sleeping.  Increased craving for drugs.  When symptoms start and how long they last depend on the kind of opioid you have been taking.  Short-acting opioids, such as heroin and oxycodone, work fast and then lose their effect quickly. Symptoms occur within hours of stopping or reducing the amount you take. The worst symptoms (peak withdrawal) occur in 24–48 hours. Symptoms should diminish over the next 3 to 5 days.  Long-acting opioids, such as methadone, work for a longer period of time. Symptoms can occur within 30 hours of stopping or reducing the amount you take, and they usually resolve over the next 10 days or so.  There are also medicines that block the effects of opioids (opioid antagonists), such as naltrexone or naloxone, and partial agonists such as buprenorphine. If you take one of these medicines, symptoms begin within minutes.    How is this diagnosed?  This condition is diagnosed based on:  Your symptoms.  Your medical history, including:  Your history of drug and alcohol use.  The medicines you have been taking.  A physical exam.  Other tests, such as an ECG to look at the rhythm of your heart or blood tests to check for problems.  Your health care provider may ask you to see a mental health professional or addiction specialist.    How is this treated?  A group of people participating in a support group or counseling session.  Treatment for this condition is usually provided by mental health professionals with training in substance use disorders (addiction specialists). Treatment may involve:  Counseling. This treatment is also called talk therapy. It is provided by substance use treatment counselors.  Support groups. Support groups are run by people who have quit using opioids. They provide emotional support, advice, and guidance.  Medicines. The medicines used may depend on the severity of your withdrawal. Options may include:  Medicines to help reduce certain withdrawal symptoms, such as nausea, vomiting, diarrhea, or stomach cramps.  An opioid or opioid-like medicine, such as methadone or buprenorphine, to replace the opioid that you have been taking. You may be asked to take less and less of this medicine over time to reduce or prevent withdrawal symptoms.  Other medicines that may decrease the effects of withdrawal.  Follow these instructions at home:  Take over-the-counter and prescription medicines only as told by your health care provider.  Always check with your health care provider before starting any new medicines.  Keep all follow-up visits. This is important. Follow-up visits include mental health and counseling visits.  Contact a health care provider if:  You are not able to take your medicines as told.  Your symptoms get worse.  You take an opioid after stopping use, or you take more of an opioid than you have been.  You have questions about how to take your medicines or you are unsure of when to take them.  Get help right away if:  You have a seizure.  You lose consciousness.  You cannot stop vomiting.  You are unable to drink or eat, and you become weak and dizzy.  You develop chest pain, shortness of breath, or fever.  You have serious thoughts about hurting yourself or others.  These symptoms may represent a serious problem that is an emergency. Do not wait to see if the symptoms will go away. Get medical help right away. Call your local emergency services (654 in the [place] Do not drive yourself to the hospital.    If you ever feel like you may hurt yourself or others, or have thoughts about taking your own life, get help right away. Go to your nearest emergency department or:  Call your local emergency services (571 in the [place]  Call a suicide crisis helpline, such as the National Suicide Prevention Lifeline at 1-776.128.8221 or 003 in the [place] This is open 24 hours a day in the [place]  If you’re a [place]:  Call 988 and press 1. This is open 24 hours a day.  Text the Veterans Crisis Line at 542677.  Summary  Opioid withdrawal is a group of symptoms that can occur if you have been taking opioids for a period of time and suddenly stop.  Symptoms range from mild to severe, but opioid withdrawal is usually not life-threatening.  Common symptoms include anxiety, tremors, chills, body aches, nausea, vomiting, and diarrhea. Symptoms may last 5–10 days or so depending on the type of opioids that were involved.  Treatment may include counseling, support groups, and medicines.  This information is not intended to replace advice given to you by your health care provider. Make sure you discuss any questions you have with your health care provider.

## 2025-02-10 NOTE — ED ADULT NURSE NOTE - NSFALLUNIVINTERV_ED_ALL_ED
Bed/Stretcher in lowest position, wheels locked, appropriate side rails in place/Call bell, personal items and telephone in reach/Instruct patient to call for assistance before getting out of bed/chair/stretcher/Non-slip footwear applied when patient is off stretcher/[place] to call system/Physically safe environment - no spills, clutter or unnecessary equipment/Purposeful proactive rounding/Room/bathroom lighting operational, light cord in reach

## 2025-02-10 NOTE — ED PROVIDER NOTE - PATIENT PORTAL LINK FT
You can access the FollowMyHealth Patient Portal offered by [facility] by registering at the following website: [email or web address]. By joining [employer]’s FollowMyHealth portal, you will also be able to view your health information using other applications (apps) compatible with our system.

## 2025-02-10 NOTE — ED ADULT NURSE NOTE - OBJECTIVE STATEMENT
44y male w/ pmh of opioid abuse presents with withdrawal/ medication refill. Pt states he was in [place] and takes suboxone. Pt states he left his suboxone in [place] and is requesting a dose here before he sees his MD tomorrow. Pt endorses nausea, anxiety, chills. Pt denies illicit drug use, alcohol use, SI, HI.

## 2025-02-10 NOTE — ED PROVIDER NOTE - OBJECTIVE STATEMENT
Dr. Helm: 44-year-old male history of opioid use disorder on Suboxone 16mg daily, seen at [facility] a month ago for opioid withdrawal due to missed Suboxone dose, presenting now with the same.  States he left his Suboxone in [place], last dose was 2 days ago, complains of chills anxiety and nausea.  Denies vomiting, abdominal pain, fevers.  Denies overdose.  Denies SI or HI.  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  Admits that same thing happened last month when he returned from a trip for his wife's treatment and left his Suboxone behind.  States recently they went to [place], and he left his Suboxone behind, has his box with him to confirm dose and Rx.  Patient states he called his PMD who prescribed him the Suboxone and has an appointment with him tomorrow and the PMD will refill Suboxone, does not need another prescription, just asking for 1 dose.  States that he took his last dose 2 days ago and started feeling mild nausea and chills today so he came in.  Patient states he took 0.2 mg of clonidine prior to arrival and feels a little bit better after that.